# Patient Record
Sex: FEMALE | Race: BLACK OR AFRICAN AMERICAN | NOT HISPANIC OR LATINO | URBAN - METROPOLITAN AREA
[De-identification: names, ages, dates, MRNs, and addresses within clinical notes are randomized per-mention and may not be internally consistent; named-entity substitution may affect disease eponyms.]

---

## 2017-01-24 ENCOUNTER — EMERGENCY (EMERGENCY)
Facility: HOSPITAL | Age: 62
LOS: 1 days | Discharge: HOME | End: 2017-01-24
Admitting: EMERGENCY MEDICINE
Payer: COMMERCIAL

## 2017-01-24 VITALS
HEART RATE: 65 BPM | OXYGEN SATURATION: 98 % | SYSTOLIC BLOOD PRESSURE: 124 MMHG | TEMPERATURE: 98 F | RESPIRATION RATE: 18 BRPM | DIASTOLIC BLOOD PRESSURE: 74 MMHG

## 2017-01-24 PROCEDURE — 99284 EMERGENCY DEPT VISIT MOD MDM: CPT

## 2017-01-24 PROCEDURE — 73502 X-RAY EXAM HIP UNI 2-3 VIEWS: CPT | Mod: 26,RT

## 2017-01-24 PROCEDURE — 73590 X-RAY EXAM OF LOWER LEG: CPT | Mod: 26,RT

## 2017-01-24 PROCEDURE — 73630 X-RAY EXAM OF FOOT: CPT | Mod: 26,RT

## 2017-01-24 PROCEDURE — 73564 X-RAY EXAM KNEE 4 OR MORE: CPT | Mod: 26,RT

## 2017-01-24 PROCEDURE — 73610 X-RAY EXAM OF ANKLE: CPT | Mod: 26,RT

## 2017-01-24 RX ORDER — MOMETASONE FUROATE AND FORMOTEROL FUMARATE DIHYDRATE 200; 5 UG/1; UG/1
0 AEROSOL RESPIRATORY (INHALATION)
Qty: 0 | Refills: 0 | COMMUNITY

## 2017-01-24 NOTE — ED PROVIDER NOTE - CARE PLAN
Principal Discharge DX:	Musculoskeletal pain  Instructions for follow-up, activity and diet:	Follow up with your Doctor in 1-2 days.  Rest.  Ice 3-4 x a day.  Take Tylenol 650mg orally every 6 hours as needed for pain.  Return to the ER for any persistent/worsening or new symptoms weakness, numbness, dizziness, headache or any concerning symptoms.
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
## 2017-01-24 NOTE — ED PROVIDER NOTE - PROGRESS NOTE DETAILS
EVANS Ivory: pt feels better ambulating without difficulty.  Results reviewed with patient.  Discharge reviewed and discussed with patient.  X-ray no acute fractures.

## 2017-01-24 NOTE — ED PROVIDER NOTE - PLAN OF CARE
Follow up with your Doctor in 1-2 days.  Rest.  Ice 3-4 x a day.  Take Tylenol 650mg orally every 6 hours as needed for pain.  Return to the ER for any persistent/worsening or new symptoms weakness, numbness, dizziness, headache or any concerning symptoms.
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
## 2017-01-25 VITALS
HEART RATE: 60 BPM | OXYGEN SATURATION: 99 % | SYSTOLIC BLOOD PRESSURE: 121 MMHG | RESPIRATION RATE: 18 BRPM | DIASTOLIC BLOOD PRESSURE: 60 MMHG

## 2019-12-11 ENCOUNTER — EMERGENCY (EMERGENCY)
Facility: HOSPITAL | Age: 64
LOS: 1 days | Discharge: HOME | End: 2019-12-11
Attending: EMERGENCY MEDICINE | Admitting: EMERGENCY MEDICINE
Payer: COMMERCIAL

## 2019-12-11 VITALS
HEART RATE: 68 BPM | DIASTOLIC BLOOD PRESSURE: 87 MMHG | RESPIRATION RATE: 18 BRPM | TEMPERATURE: 98 F | SYSTOLIC BLOOD PRESSURE: 149 MMHG | OXYGEN SATURATION: 99 %

## 2019-12-11 VITALS
TEMPERATURE: 98 F | RESPIRATION RATE: 16 BRPM | OXYGEN SATURATION: 100 % | SYSTOLIC BLOOD PRESSURE: 158 MMHG | DIASTOLIC BLOOD PRESSURE: 74 MMHG | HEART RATE: 54 BPM

## 2019-12-11 PROCEDURE — 76705 ECHO EXAM OF ABDOMEN: CPT | Mod: 26

## 2019-12-11 PROCEDURE — 99284 EMERGENCY DEPT VISIT MOD MDM: CPT

## 2019-12-11 PROCEDURE — 93971 EXTREMITY STUDY: CPT | Mod: 26,LT

## 2019-12-11 RX ORDER — IBUPROFEN 200 MG
600 TABLET ORAL ONCE
Refills: 0 | Status: COMPLETED | OUTPATIENT
Start: 2019-12-11 | End: 2019-12-11

## 2019-12-11 RX ADMIN — Medication 600 MILLIGRAM(S): at 21:17

## 2019-12-11 NOTE — ED PROVIDER NOTE - OBJECTIVE STATEMENT
64yof with no significant pmhx presents to ED with complaints of acute onset R calf pain. Pt woke up 2 days ago with mild RUQ pain, felt spasm and achy in the R calf. Pt denies nausea, vomiting, diarrhea, fevers, CP. Pt states she called pmd and he advised to visit ED to r/o PE. Pt is well appearing

## 2019-12-11 NOTE — ED PROVIDER NOTE - PATIENT PORTAL LINK FT
You can access the FollowMyHealth Patient Portal offered by [facility] by registering at the following website: [email or web address]. By joining [employer]’s FollowMyHealth portal, you will also be able to view your health information using other applications (apps) compatible with our system.

## 2019-12-11 NOTE — ED ADULT NURSE NOTE - OBJECTIVE STATEMENT
pt arrives w/ c/o right leg pain and RUQ abdominal pain since yesterday. pt denies any nausea or vomiting. pt appears comfortable and in NAD. pt gvn pain meds as per order will reassess for improvement.

## 2019-12-11 NOTE — ED ADULT TRIAGE NOTE - CHIEF COMPLAINT QUOTE
Pt with multiple complaints reports leg cramping since Saturday night that woke her out of her sleep. Pt also states tody developed to pain under right breast.  Pt denies sob.

## 2019-12-12 PROBLEM — J45.909 UNSPECIFIED ASTHMA, UNCOMPLICATED: Chronic | Status: ACTIVE | Noted: 2017-01-24

## 2024-01-08 NOTE — ED PROVIDER NOTE - PHYSICAL EXAMINATION
Patient significantly encephalopathic, lethargic with asterixis on exam  Patient currently under thiamine protocol for concern for Warnicke's  Ammonia level noted to be 178  Cont lactulose and Xifaxan  Mentation confounded by alcohol withdrawal and serax in ICU   RLE: NV intact, mild TTP at lateral aspect of left hip no swelling no ecchymosis, FROM. Right knee: mild TTP at lateral aspect, FROM, no swelling no deformity.  Right Tib/Fib mild TTP at anterior aspect of tibia no swelling no deformity.  Right Ankle: mild TTP at medial/lateral aspect of ankle mild swelling, FROM.  Right foot: NV intact, mild TTP to top of foot no swelling no deformity.